# Patient Record
Sex: FEMALE | Race: WHITE | NOT HISPANIC OR LATINO | Employment: UNEMPLOYED | URBAN - NONMETROPOLITAN AREA
[De-identification: names, ages, dates, MRNs, and addresses within clinical notes are randomized per-mention and may not be internally consistent; named-entity substitution may affect disease eponyms.]

---
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
## 2023-03-15 ENCOUNTER — OFFICE VISIT (OUTPATIENT)
Dept: CARDIOLOGY | Facility: CLINIC | Age: 18
End: 2023-03-15
Payer: COMMERCIAL

## 2023-03-15 VITALS
BODY MASS INDEX: 18.44 KG/M2 | DIASTOLIC BLOOD PRESSURE: 74 MMHG | SYSTOLIC BLOOD PRESSURE: 110 MMHG | HEART RATE: 121 BPM | WEIGHT: 108 LBS | HEIGHT: 64 IN

## 2023-03-15 DIAGNOSIS — R06.09 DYSPNEA ON EXERTION: ICD-10-CM

## 2023-03-15 DIAGNOSIS — R55 PRE-SYNCOPE: ICD-10-CM

## 2023-03-15 DIAGNOSIS — R55 SYNCOPE AND COLLAPSE: Primary | ICD-10-CM

## 2023-03-15 DIAGNOSIS — R00.2 PALPITATIONS: ICD-10-CM

## 2023-03-15 PROCEDURE — 1160F RVW MEDS BY RX/DR IN RCRD: CPT | Performed by: PHYSICIAN ASSISTANT

## 2023-03-15 PROCEDURE — 99204 OFFICE O/P NEW MOD 45 MIN: CPT | Performed by: PHYSICIAN ASSISTANT

## 2023-03-15 PROCEDURE — 1159F MED LIST DOCD IN RCRD: CPT | Performed by: PHYSICIAN ASSISTANT

## 2023-03-15 NOTE — PROGRESS NOTES
Problem list     Subjective   Arabella Pierre is a 17 y.o. female     Chief Complaint   Patient presents with   • Establish Care     Dizziness with standing   • Loss of Consciousness       HPI  The patient presents in the clinic today to establish cardiovascular care.  This very pleasant patient presents to the clinic today in the setting of dizziness, tachycardia, and presyncope.  She did have a syncopal episode approximately a year ago, occurring during pregnancy and felt questionably a complication related to the same by her report.  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  Irregardless, she has been seen for intermittent episodes of dizziness and presyncope since, at one point felt to be possible hypoglycemia.  She has adjusted diet to the best of her ability to address that.  She has adjusted lifestyle otherwise.  Despite that, symptoms persist.  She now would like to be evaluated for POTS.  Upon research, she feels a lot of her symptoms are related to the same.  With position change, she gets very dizzy, orthostatic by description.  She then develops a very sudden, rapid heartbeat.  She has weakness, dizziness, and even presyncope with this frequently.  Symptoms appear to be intensifying.  She has no associated chest pain.  She denies recent syncope.  She apparently has had laboratories with her primary care provider, most of which have been normal.  After ongoing symptoms, it was felt best to have the patient evaluated from cardiovascular standpoint.  She presents today in the setting.    No current outpatient medications on file prior to visit.     No current facility-administered medications on file prior to visit.       Patient has no known allergies.    Past Medical History:   Diagnosis Date   • ADHD (attention deficit hyperactivity disorder)    • Anxiety    • Bipolar 1 disorder (HCC)    • Depression    • Low BP        Social History     Socioeconomic History   •  "Marital status: Single   Tobacco Use   • Smoking status: Every Day   Substance and Sexual Activity   • Alcohol use: Never   • Drug use: Not Currently     Comment: Past Drug User.   • Sexual activity: Defer       Family History   Family history unknown: Yes       Review of Systems   Constitutional: Negative for activity change, appetite change, chills, fatigue and fever.   HENT: Negative.  Negative for congestion.    Eyes: Negative for visual disturbance.   Respiratory: Positive for shortness of breath. Negative for apnea, cough, chest tightness and wheezing.    Cardiovascular: Positive for chest pain. Negative for palpitations and leg swelling.   Gastrointestinal: Negative.  Negative for blood in stool.   Endocrine: Positive for cold intolerance and heat intolerance.   Genitourinary: Negative.  Negative for hematuria.   Musculoskeletal: Positive for back pain and gait problem. Negative for arthralgias, joint swelling, neck pain and neck stiffness.   Skin: Negative.  Negative for color change, rash and wound.   Allergic/Immunologic: Negative.  Negative for environmental allergies and food allergies.   Neurological: Positive for dizziness, syncope, weakness and light-headedness. Negative for numbness and headaches.   Hematological: Bruises/bleeds easily.   Psychiatric/Behavioral: Negative.  Negative for sleep disturbance.       Objective   Vitals:    03/15/23 1021 03/15/23 1022 03/15/23 1023   BP: 113/73 110/73 110/74   BP Location: Left arm Left arm Right arm   Patient Position: Lying Sitting Standing   Cuff Size: Adult Adult Adult   Pulse: 85 96 (!) 121   Weight: 49 kg (108 lb)     Height: 162.6 cm (64\")        /74 (BP Location: Right arm, Patient Position: Standing, Cuff Size: Adult)   Pulse (!) 121   Ht 162.6 cm (64\")   Wt 49 kg (108 lb)   BMI 18.54 kg/m²    Lab Results (most recent)     None        Physical Exam  Vitals and nursing note reviewed.   Constitutional:       General: She is not in acute " distress.     Appearance: She is well-developed.   HENT:      Head: Normocephalic and atraumatic.   Eyes:      Conjunctiva/sclera: Conjunctivae normal.      Pupils: Pupils are equal, round, and reactive to light.   Neck:      Vascular: No JVD.      Trachea: No tracheal deviation.   Cardiovascular:      Rate and Rhythm: Normal rate and regular rhythm.      Heart sounds: Normal heart sounds.   Pulmonary:      Effort: Pulmonary effort is normal.      Breath sounds: Normal breath sounds.   Abdominal:      General: Bowel sounds are normal. There is no distension.      Palpations: Abdomen is soft. There is no mass.      Tenderness: There is no abdominal tenderness. There is no guarding or rebound.   Musculoskeletal:         General: No tenderness or deformity. Normal range of motion.      Cervical back: Normal range of motion and neck supple.   Skin:     General: Skin is warm and dry.      Coloration: Skin is not pale.      Findings: No erythema or rash.   Neurological:      Mental Status: She is alert and oriented to person, place, and time.   Psychiatric:         Behavior: Behavior normal.         Thought Content: Thought content normal.         Judgment: Judgment normal.           Procedure   Procedures       Assessment & Plan      Diagnosis Plan   1. Syncope and collapse  Tilt Table    Adult Transthoracic Echo Complete W/ Cont if Necessary Per Protocol    Cardiac Event Monitor      2. Pre-syncope        3. Palpitations        4. Dyspnea on exertion          1.  The patient presents today for recurrent episodes of palpitations/tachycardia, dizziness, presyncope, and even history of syncope.  She is very concerned that symptoms reflect POTS.  With orthostatic blood pressure checks today, she had significant tachycardia, but really no orthostatic blood pressure checks.  Irregardless, I feel the patient needs further evaluation.  Given her extensive history as above, we will schedule for tilt table for further  evaluation.    2.  I would also schedule for an event monitor.  I feel we need to evaluate for dysrhythmic substrates, or rate or rhythm disturbance issues otherwise.    3.  We will schedule for an echocardiogram to evaluate LV size and function, valvular morphologies, and cardiac structure otherwise.    4.  We will make no adjustments to medications.  We may have to adjust pending results of all the above.  I suspect we will at least have to use and consider beta-blocker therapy, may be even medications to foster blood pressure.  We will make no adjustments until we can see study results.    5.  We have encouraged increase hydration, liberal salt utilization, and lifestyle changes otherwise to address current symptoms.    6.  Further pending all the above.           Arabella Irwinhart  reports that she has been smoking. She does not have any smokeless tobacco history on file.. I have educated her on the risk of diseases from using tobacco products such as cancer, COPD and heart disease.     I advised her to quit and she is not willing to quit.    I spent 3  minutes counseling the patient.       Patient did not bring med list or medicine bottles to appointment, med list has been reviewed and updated based on patient's knowledge of their meds.         Electronically signed by:

## 2023-03-16 ENCOUNTER — TELEPHONE (OUTPATIENT)
Dept: CARDIOLOGY | Facility: CLINIC | Age: 18
End: 2023-03-16

## 2023-03-16 NOTE — TELEPHONE ENCOUNTER
Caller: Arabella Pierre    Relationship to patient: Self    Best call back number: [phone]    Patient is needing: PT CALLED TO REPORT THAT THE MONITOR WILL NOT STICK ON, SHE HAS USED SEVERAL OF THE PROVIDED BANDAGES AND IT WONT STAY - PT WOULD LIKE TO COME BY THE OFFICE TO GET EXTRA STICKY ONES IF THEY ARE AVAILABLE

## 2023-03-20 ENCOUNTER — TELEPHONE (OUTPATIENT)
Dept: CARDIOLOGY | Facility: CLINIC | Age: 18
End: 2023-03-20
Payer: COMMERCIAL

## 2023-03-20 NOTE — TELEPHONE ENCOUNTER
Cardiac Report:Day 3 Serious    Report Analysis: SVT    Per Lokesh Hylton,PAC please get sx check.    Patient states no sx, and monitor is not working will come in, in the am and I will help her with it.\\    Lokesh Hylton,PAC aware      Rose Mendoza MA

## 2023-03-27 ENCOUNTER — TELEPHONE (OUTPATIENT)
Dept: CARDIOLOGY | Facility: CLINIC | Age: 18
End: 2023-03-27
Payer: COMMERCIAL

## 2023-03-27 NOTE — TELEPHONE ENCOUNTER
Received notification of no transmission from [employer]. I had to leave  for a return call. Needing to see if there is something we can help with.

## 2023-03-28 ENCOUNTER — TELEPHONE (OUTPATIENT)
Dept: CARDIOLOGY | Facility: CLINIC | Age: 18
End: 2023-03-28
Payer: COMMERCIAL

## 2023-04-07 ENCOUNTER — HOSPITAL ENCOUNTER (OUTPATIENT)
Dept: CARDIOLOGY | Facility: HOSPITAL | Age: 18
Discharge: HOME | End: 2023-04-07
Payer: COMMERCIAL

## 2023-04-07 DIAGNOSIS — R55 SYNCOPE AND COLLAPSE: ICD-10-CM

## 2023-04-07 LAB
BH CV ECHO MEAS - ACS: 2.07 CM
BH CV ECHO MEAS - AO MAX PG: 3.9 MMHG
BH CV ECHO MEAS - AO MEAN PG: 2.6 MMHG
BH CV ECHO MEAS - AO ROOT DIAM: 2.6 CM
BH CV ECHO MEAS - AO V2 MAX: 99.1 CM/SEC
BH CV ECHO MEAS - AO V2 VTI: 22.3 CM
BH CV ECHO MEAS - EDV(CUBED): 73.6 ML
BH CV ECHO MEAS - EDV(MOD-SP4): 47 ML
BH CV ECHO MEAS - EF(MOD-SP4): 66 %
BH CV ECHO MEAS - ESV(CUBED): 21 ML
BH CV ECHO MEAS - ESV(MOD-SP4): 16 ML
BH CV ECHO MEAS - FS: 34.1 %
BH CV ECHO MEAS - IVS/LVPW: 0.72 CM
BH CV ECHO MEAS - IVSD: 0.65 CM
BH CV ECHO MEAS - LA DIMENSION: 2.1 CM
BH CV ECHO MEAS - LAT PEAK E' VEL: 13.7 CM/SEC
BH CV ECHO MEAS - LV DIASTOLIC VOL/BSA (35-75): 31.2 CM2
BH CV ECHO MEAS - LV MASS(C)D: 96.3 GRAMS
BH CV ECHO MEAS - LV SYSTOLIC VOL/BSA (12-30): 10.6 CM2
BH CV ECHO MEAS - LVIDD: 4.2 CM
BH CV ECHO MEAS - LVIDS: 2.8 CM
BH CV ECHO MEAS - LVPWD: 0.9 CM
BH CV ECHO MEAS - MED PEAK E' VEL: 13.5 CM/SEC
BH CV ECHO MEAS - MV A MAX VEL: 75 CM/SEC
BH CV ECHO MEAS - MV DEC SLOPE: 882 CM/SEC2
BH CV ECHO MEAS - MV E MAX VEL: 90 CM/SEC
BH CV ECHO MEAS - MV E/A: 1.2
BH CV ECHO MEAS - RVDD: 1.53 CM
BH CV ECHO MEAS - SI(MOD-SP4): 20.6 ML/M2
BH CV ECHO MEAS - SV(MOD-SP4): 31 ML
BH CV ECHO MEASUREMENTS AVERAGE E/E' RATIO: 6.62
MAXIMAL PREDICTED HEART RATE: 200 BPM
STRESS TARGET HR: 170 BPM

## 2023-04-07 PROCEDURE — 93306 TTE W/DOPPLER COMPLETE: CPT

## 2023-05-01 ENCOUNTER — TELEPHONE (OUTPATIENT)
Dept: CARDIOLOGY | Facility: CLINIC | Age: 18
End: 2023-05-01
Payer: COMMERCIAL

## 2023-05-01 NOTE — TELEPHONE ENCOUNTER
Caller: Arabella Pierre    Relationship: Self    Best call back number: [phone]    What is the best time to reach you: ANY    Who are you requesting to speak with (clinical staff, provider,  specific staff member): CLINICAL     What was the call regarding: PT WAS RETURNING CALL TO OFFICE ABOUT HER ECHO RESULTS. ATTEMPTED TO WARM TRANSFER.     Do you require a callback: YES

## 2023-05-01 NOTE — TELEPHONE ENCOUNTER
Patient has been notified, she states she is stable at this time. Was currently diagnosed with POTS, and does have sx sometimes but wants to keep follow up at this time. If any new or worsening she will contact office.       ----- Message from RENATO Dale sent at 4/30/2023  6:10 PM EDT -----  Routine follow-up.  Fairly frequent episodes of sinus tachycardia noted.  If symptomatic, suppression with beta-blocker therapy would be indicated.  ----- Message -----  From: Rodrigo Arizmendi MD  Sent: 4/30/2023  11:51 AM EDT  To: RENATO Dale      Result Text  1.  Sinus rhythm is a baseline and predominant rhythm noted throughout the study.  2.  Recurrent episodes of sinus tachycardia are noted, maximum heart rate at 180 bpm.  3.  An episode of narrow complex tachycardia is reported at 4:30 PM on 3/17/2023.  Rate was 161 bpm.  This appears to be sinus rhythm, but in some parts of telemetry SVT cannot be excluded.  Clinical correlation is recommended.  4.  Symptoms recurrently occur during sinus tachycardia.  5.  No other arrhythmia, ectopy, or block of significance is noted.

## 2023-05-01 NOTE — TELEPHONE ENCOUNTER
ECHO  Called patient to notify of no acute findings or abnormalities. Keep follow up as scheduled. If you have any problem between now and then give our office a call.   ----- Message from Rose Mendoza MA sent at 5/1/2023  8:51 AM EDT -----    ----- Message -----  From: Lokesh Hylton PA  Sent: 4/30/2023   6:09 PM EDT  To: Rose Mendoza MA    Routine follow-up.  ----- Message -----  From: Rodrigo Arizmendi MD  Sent: 4/29/2023   2:52 PM EDT  To: RENATO Dale

## 2023-07-24 ENCOUNTER — TELEPHONE (OUTPATIENT)
Dept: CARDIOLOGY | Facility: CLINIC | Age: 18
End: 2023-07-24

## 2023-07-24 NOTE — TELEPHONE ENCOUNTER
Caller: Arabella Pierre    Relationship to patient: Self    Best call back number:[phone] ANYTIME OK TO LEAVE VM    Chief complaint: HOSPITAL FOLLOW UP        Requested date:   HOSPITAL FOLLOW UP 2-3 DAYS FROM 7-24-23    If rescheduling, when is the original appointment:  10-18-23    Additional notes: PATIENT WAS IN HOSPITAL AND NEEDS A FOLLOW UP APPOINTMENT . PLEASE REACH OUT TO PATIENT TO RESCHEDULE 10-18-23 SOONER

## 2023-08-02 ENCOUNTER — OFFICE VISIT (OUTPATIENT)
Dept: CARDIOLOGY | Facility: CLINIC | Age: 18
End: 2023-08-02
Payer: COMMERCIAL

## 2023-08-02 VITALS
OXYGEN SATURATION: 98 % | HEIGHT: 64 IN | BODY MASS INDEX: 18.51 KG/M2 | HEART RATE: 81 BPM | SYSTOLIC BLOOD PRESSURE: 110 MMHG | DIASTOLIC BLOOD PRESSURE: 74 MMHG | WEIGHT: 108.4 LBS

## 2023-08-02 DIAGNOSIS — R55 SYNCOPE AND COLLAPSE: Primary | ICD-10-CM

## 2023-08-02 DIAGNOSIS — R00.2 PALPITATIONS: ICD-10-CM

## 2023-08-02 DIAGNOSIS — R55 PRE-SYNCOPE: ICD-10-CM

## 2023-08-02 PROCEDURE — 1159F MED LIST DOCD IN RCRD: CPT | Performed by: PHYSICIAN ASSISTANT

## 2023-08-02 PROCEDURE — 1160F RVW MEDS BY RX/DR IN RCRD: CPT | Performed by: PHYSICIAN ASSISTANT

## 2023-08-02 PROCEDURE — 99214 OFFICE O/P EST MOD 30 MIN: CPT | Performed by: PHYSICIAN ASSISTANT

## 2023-08-22 ENCOUNTER — OFFICE VISIT (OUTPATIENT)
Dept: CARDIOLOGY | Facility: CLINIC | Age: 18
End: 2023-08-22
Payer: COMMERCIAL

## 2023-08-22 VITALS
BODY MASS INDEX: 17.75 KG/M2 | HEIGHT: 64 IN | OXYGEN SATURATION: 99 % | DIASTOLIC BLOOD PRESSURE: 72 MMHG | SYSTOLIC BLOOD PRESSURE: 102 MMHG | HEART RATE: 74 BPM | WEIGHT: 104 LBS

## 2023-08-22 DIAGNOSIS — G90.A POTS (POSTURAL ORTHOSTATIC TACHYCARDIA SYNDROME): Primary | ICD-10-CM

## 2023-08-22 DIAGNOSIS — E16.2 HYPOGLYCEMIA: ICD-10-CM

## 2023-08-22 DIAGNOSIS — I95.89 CHRONIC HYPOTENSION: ICD-10-CM

## 2023-08-22 PROBLEM — Z00.8 MEDICAL CLEARANCE FOR INCARCERATION: Status: ACTIVE | Noted: 2023-08-22

## 2023-08-22 PROBLEM — Z72.89 DELIBERATE SELF-CUTTING: Status: ACTIVE | Noted: 2023-08-22

## 2023-08-22 PROBLEM — R07.89 ATYPICAL CHEST PAIN: Status: ACTIVE | Noted: 2023-08-22

## 2023-08-22 PROBLEM — R45.851 SUICIDAL IDEATIONS: Status: ACTIVE | Noted: 2023-08-22

## 2023-08-22 PROBLEM — F12.90 MARIJUANA USE: Status: ACTIVE | Noted: 2023-08-22

## 2023-08-22 PROBLEM — F15.10 METHAMPHETAMINE USE: Status: ACTIVE | Noted: 2023-08-22

## 2023-08-22 PROBLEM — T50.901A MEDICATION OVERDOSE: Status: ACTIVE | Noted: 2023-08-22

## 2023-08-22 PROBLEM — N92.0 MENORRHAGIA: Status: ACTIVE | Noted: 2023-08-22

## 2023-08-22 PROBLEM — R10.9 ABDOMINAL PAIN: Status: ACTIVE | Noted: 2023-08-22

## 2023-08-22 PROBLEM — E87.6 HYPOKALEMIA: Status: ACTIVE | Noted: 2023-08-22

## 2023-08-22 PROBLEM — F32.A DEPRESSION: Status: ACTIVE | Noted: 2023-08-22

## 2023-08-22 PROBLEM — R51.9 HEADACHE: Status: ACTIVE | Noted: 2023-08-22

## 2023-08-22 PROCEDURE — 99214 OFFICE O/P EST MOD 30 MIN: CPT | Performed by: NURSE PRACTITIONER

## 2023-08-22 RX ORDER — MIDODRINE HYDROCHLORIDE 2.5 MG/1
2.5 TABLET ORAL 2 TIMES DAILY
Qty: 180 TABLET | Refills: 1 | Status: SHIPPED | OUTPATIENT
Start: 2023-08-22

## 2023-08-22 RX ORDER — BLOOD-GLUCOSE METER
1 KIT MISCELLANEOUS DAILY
Qty: 1 EACH | Refills: 1 | Status: SHIPPED | OUTPATIENT
Start: 2023-08-22

## 2023-08-22 RX ORDER — WEIGH SCALE
1 MISCELLANEOUS MISCELLANEOUS DAILY
Qty: 1 EACH | Refills: 0 | Status: SHIPPED | OUTPATIENT
Start: 2023-08-22

## 2023-08-22 RX ORDER — ONDANSETRON 4 MG/1
4 TABLET, ORALLY DISINTEGRATING ORAL
COMMUNITY
Start: 2023-07-21

## 2023-08-22 RX ORDER — METOPROLOL SUCCINATE 25 MG/1
25 TABLET, EXTENDED RELEASE ORAL DAILY
Qty: 90 TABLET | Refills: 1 | Status: SHIPPED | OUTPATIENT
Start: 2023-08-22

## 2023-08-22 NOTE — PROGRESS NOTES
[facility] Provider Note     Date:   2023   Name: Arabella Pierre  :   2005  PCP: Josette Huggins APRN    Chief Complaint   Patient presents with    Establish Care     POTS       Subjective     History of Present Illness  Arabella Pierre is a 18 y.o. female who presents today to establish cardiac care for POTS in our office.  She was seen by RENATO Anderson cardiology [place].  Her tilt table test was positive.  She reports for the last 3 years since she got pregnant she has had body aches, chest pain, her back hurts, dizziness, nausea, lightheaded, stomach pain, passes out loses consciousness sometimes, blurry vision.  She had symptoms without passing out as recent as this morning.  Her daughter is 2 years old.  She got pregnant when she was 15 years old she was actively doing math, snorting and smoking it as well as THC and pain pills.  She stopped as soon as she found out but did continue to smoke marijuana until she was little over 8 months pregnant.    She already make sure to drink 2 to 3 L of water a day and has increased her sodium to 8 g a day.  I suggested she also try compression stockings.  She has known anxiety and QUINN that she was prescribed BuSpar and Celexa for but never started it.  She reports with her past history of drug abuse she is afraid to take medications.  Today she would like to do a trial of midodrine and metoprolol.  We will start both at a low dose.  She is to call reach out if she has any issues as she reports she cannot come back for follow-up until her boyfriend who is in the  is back in town in November.  She drives but only locally in [facility] she is afraid to drive long distances.    She reports that she uses cigarettes and vapes all day.  She only very rarely drinks alcohol.  Her [place] score is a 0 today.  Holter sinus tachycardia.    Lastly, she says the Er told her her blood sugar gets low and she  needs to eat more regularly.  I am going to ask her to repeat fasting labs as well as order her blood pressure cuff and glucometer to see if insurance will pay for it.  I would like for her to be able to check her blood pressure and heart rate as she starting the midodrine and metoprolol as well as keep an eye on her blood sugar.    Coexisting ADHD, anxiety, depression, bipolar 1 hypotension, history of meth use, history of overdose, history of incarceration, history of deliberate self cutting.    Avoid Caffeine and stimulants  Drink 2-3 liters of water a day  Increase sodium to 8 grams a day  Exercise regularly  Start Counseling to help with anxiety  Rise slowly  Eat small, regular meals and avoid simple carbs   Start metoprolol and Midodrine  Check blood pressure, heart rate, and glucose regularly  Anxiety/stress reduction  Compression Stockings    Cardiology and sleep related problem list    POTS with positive tilt table  [place] 0      Coexisting ADHD, anxiety, depression, bipolar 1 hypotension, history of meth use, history of overdose, history of incarceration, history of deliberate self cutting.    Allergies   Allergen Reactions    Penicillins Other (See Comments)     Unknown reaction. Mother has severe action and cause death in mother and will not take medication       Current Outpatient Medications:     ondansetron ODT (ZOFRAN-ODT) 4 MG disintegrating tablet, 1 tablet., Disp: , Rfl:     Blood Pressure Monitoring (Blood Pressure Monitor/S Cuff) misc, 1 Units Daily., Disp: 1 each, Rfl: 0    glucose monitor monitoring kit, 1 each Daily., Disp: 1 each, Rfl: 1    metoprolol succinate XL (TOPROL-XL) 25 MG 24 hr tablet, Take 1 tablet by mouth Daily., Disp: 90 tablet, Rfl: 1    midodrine (PROAMATINE) 2.5 MG tablet, Take 1 tablet by mouth 2 (Two) Times a Day., Disp: 180 tablet, Rfl: 1    Past Medical History:   Diagnosis Date    ADHD (attention deficit hyperactivity disorder)     Anxiety     Bipolar 1 disorder      "Depression     Low BP       History reviewed. No pertinent surgical history.  Family History   Family history unknown: Yes     Social History     Socioeconomic History    Marital status: Single   Tobacco Use    Smoking status: Every Day     Passive exposure: Current    Smokeless tobacco: Never   Vaping Use    Vaping Use: Every day    Substances: Nicotine, Flavoring    Devices: Disposable    Passive vaping exposure: Yes   Substance and Sexual Activity    Alcohol use: Yes     Comment: very rare    Drug use: Not Currently     Types: Marijuana, Amphetamines     Comment: Past Drug User.    Sexual activity: Yes     Partners: Male       Objective     Vital Signs:  /72 (BP Location: Left arm)   Pulse 74   Ht 162.6 cm (64\")   Wt 47.2 kg (104 lb)   SpO2 99%   BMI 17.85 kg/mý   Estimated body mass index is 17.85 kg/mý as calculated from the following:    Height as of this encounter: 162.6 cm (64\").    Weight as of this encounter: 47.2 kg (104 lb).  Facility age limit for growth percentiles is 20 years.          Physical Exam                Assessment and Plan     Diagnoses and all orders for this visit:    1. POTS (postural orthostatic tachycardia syndrome) (Primary)  Comments:  Lifestyle management discussed.  Rx midodrine and metoprolol.  Orders:  -     Basic Metabolic Panel; Future  -     CBC & Differential; Future  -     TSH Rfx On Abnormal To Free T4; Future  -     Lipid Panel; Future  -     Iron; Future  -     Cancel: Cuffed BP Gauge ANEROID With Gauge  -     glucose monitor monitoring kit; 1 each Daily.  Dispense: 1 each; Refill: 1  -     Blood Pressure Monitoring (Blood Pressure Monitor/S Cuff) misc; 1 Units Daily.  Dispense: 1 each; Refill: 0    2. Chronic hypotension  Comments:  Since we are starting beta-blocker for POTS we will do midodrine  Orders:  -     Cancel: Cuffed BP Gauge ANEROID With Gauge  -     glucose monitor monitoring kit; 1 each Daily.  Dispense: 1 each; Refill: 1  -     Blood Pressure " Monitoring (Blood Pressure Monitor/S Cuff) misc; 1 Units Daily.  Dispense: 1 each; Refill: 0    3. Hypoglycemia  Comments:  Eat frequent meals.  Rx glucometer.  Update labs.    Other orders  -     midodrine (PROAMATINE) 2.5 MG tablet; Take 1 tablet by mouth 2 (Two) Times a Day.  Dispense: 180 tablet; Refill: 1  -     metoprolol succinate XL (TOPROL-XL) 25 MG 24 hr tablet; Take 1 tablet by mouth Daily.  Dispense: 90 tablet; Refill: 1        Recommendations: ER if symptoms increase, Report if any new/changing symptoms immediately, Compression hose, Stop cigarettes, Limit caffeine, Exercise recommendations discussed, and Sleep hygiene discussed          Follow Up  Return in about 2 months (around 10/22/2023) for or when she has transportation.  Patient was given instructions and counseling regarding her condition or for health maintenance advice. Please see specific information pulled into the AVS if appropriate.

## 2023-08-22 NOTE — PATIENT INSTRUCTIONS
Avoid Caffeine and stimulants  Drink 2-3 liters of water a day  Increase sodium to 8 grams a day  Exercise regularly  Start Counseling to help with anxiety  Rise slowly  Eat small, regular meals and avoid simple carbs   Start metoprolol and Midodrine  Check blood pressure, heart rate, and glucose regularly  Anxiety/stress reduction  Compression Stockings

## 2023-08-23 DIAGNOSIS — G90.A POTS (POSTURAL ORTHOSTATIC TACHYCARDIA SYNDROME): ICD-10-CM

## 2023-09-22 ENCOUNTER — TELEPHONE (OUTPATIENT)
Dept: CARDIOLOGY | Facility: CLINIC | Age: 18
End: 2023-09-22

## 2023-09-22 NOTE — TELEPHONE ENCOUNTER
Caller: Arabella Pierre    Relationship: Self    Best call back number: [phone]    What is the best time to reach you: ANY     What was the call regarding: PT STATES SHE MISSED A CALL AT 10:03 AM AND WAS CALLING BACK. PLEASE REACH OUT TO PT TO  FURTHER ADVISE.     Is it okay if the provider responds through MyChart: NO

## 2023-09-25 NOTE — TELEPHONE ENCOUNTER
Tried to contact pt to see how she is doing from a cardiac standpoint. If she is not having any issues and wants to bump out to Lokesh next available in June. If not it is fine to keept the appt on 10/18/2023.

## 2023-09-25 NOTE — TELEPHONE ENCOUNTER
Caller: Arabella Pierre    Relationship: Self    Best call back number: [phone]    What is the best time to reach you: IN AFTERNOON    What was the call regarding: PT REPORTS  WANTS TO KEEP HER APPT ON 10/18. SHE WAS PUT ON SOME NEW MEDICATIONS AND THESE HAVE BEEN GIVING HER A HEADACHE THE MORNING AFTER SHE TAKES, SHE HAS NOT BEEN TAKING THESE NOW.

## 2023-10-18 ENCOUNTER — OFFICE VISIT (OUTPATIENT)
Dept: CARDIOLOGY | Facility: CLINIC | Age: 18
End: 2023-10-18
Payer: COMMERCIAL

## 2023-10-18 VITALS
DIASTOLIC BLOOD PRESSURE: 78 MMHG | HEART RATE: 89 BPM | HEIGHT: 64 IN | OXYGEN SATURATION: 100 % | BODY MASS INDEX: 18.2 KG/M2 | SYSTOLIC BLOOD PRESSURE: 112 MMHG | WEIGHT: 106.6 LBS

## 2023-10-18 DIAGNOSIS — R55 PRE-SYNCOPE: ICD-10-CM

## 2023-10-18 DIAGNOSIS — R00.2 PALPITATIONS: ICD-10-CM

## 2023-10-18 DIAGNOSIS — G90.A POTS (POSTURAL ORTHOSTATIC TACHYCARDIA SYNDROME): Primary | ICD-10-CM

## 2023-10-18 DIAGNOSIS — R06.09 DYSPNEA ON EXERTION: ICD-10-CM

## 2023-10-18 PROCEDURE — 1160F RVW MEDS BY RX/DR IN RCRD: CPT | Performed by: PHYSICIAN ASSISTANT

## 2023-10-18 PROCEDURE — 1159F MED LIST DOCD IN RCRD: CPT | Performed by: PHYSICIAN ASSISTANT

## 2023-10-18 PROCEDURE — 99214 OFFICE O/P EST MOD 30 MIN: CPT | Performed by: PHYSICIAN ASSISTANT

## 2023-10-18 PROCEDURE — 93000 ELECTROCARDIOGRAM COMPLETE: CPT | Performed by: PHYSICIAN ASSISTANT

## 2023-10-18 NOTE — PROGRESS NOTES
Problem list     Subjective   Arabella Pierre is a 18 y.o. female     Chief Complaint   Patient presents with    Follow-up     7 month    Chest Pain       HPI  This pleasant patient presents back to the clinic today to discuss ongoing symptoms.  She was seen last evaluation to review study results.  She initially was evaluated because of her voiced concern of POTS, given symptoms.  She was scheduled for testing.  She had an echocardiogram which indicated preserved systolic function with no significant valvular nor structural abnormalities noted.  Event monitor indicated sinus rhythm with recurrent episodes of sinus tachycardia.  In 1 telemetry strip, SVT cannot be excluded.  It was felt that this was likely just sinus tachycardia.  Tilt table was performed and did indeed suggest POTS.  She was sent to a POTS clinic in [place].  She was seen and evaluated and placed on metoprolol and midodrine.  She stopped those because of reported headache on combination of those medicines.  She presents back today however because of persistent symptoms.  She has recurrent episodes of orthostasis, tachycardia, and presyncope.  She would like to discuss reinstitution of medications and requested an appointment to do that.  She denies chest pain.  Dyspnea is at baseline.  She has no further complaints.    Current Outpatient Medications on File Prior to Visit   Medication Sig Dispense Refill    glucose monitor monitoring kit 1 each Daily. 1 each 1    [DISCONTINUED] Blood Pressure Monitoring (Blood Pressure Monitor/S Cuff) misc 1 Units Daily. 1 each 0    [DISCONTINUED] metoprolol succinate XL (TOPROL-XL) 25 MG 24 hr tablet Take 1 tablet by mouth Daily. 90 tablet 1    [DISCONTINUED] midodrine (PROAMATINE) 2.5 MG tablet Take 1 tablet by mouth 2 (Two) Times a Day. 180 tablet 1    [DISCONTINUED] ondansetron ODT (ZOFRAN-ODT) 4 MG disintegrating tablet 1 tablet.       No current facility-administered medications on file prior to visit.        Penicillins    Past Medical History:   Diagnosis Date    ADHD (attention deficit hyperactivity disorder)     Anxiety     Bipolar 1 disorder     Depression     Low BP        Social History     Socioeconomic History    Marital status: Single   Tobacco Use    Smoking status: Every Day     Passive exposure: Current    Smokeless tobacco: Never   Vaping Use    Vaping Use: Every day    Substances: Nicotine, Flavoring    Devices: Disposable    Passive vaping exposure: Yes   Substance and Sexual Activity    Alcohol use: Yes     Comment: very rare    Drug use: Not Currently     Types: Marijuana, Amphetamines     Comment: Past Drug User.    Sexual activity: Yes     Partners: Male       Family History   Family history unknown: Yes       Review of Systems   Constitutional:  Positive for diaphoresis and fatigue. Negative for chills and fever.   Eyes:  Positive for visual disturbance (floaters).   Respiratory: Negative.  Negative for apnea, cough, chest tightness, shortness of breath and wheezing.    Cardiovascular:  Positive for chest pain and palpitations. Negative for leg swelling.   Gastrointestinal: Negative.  Negative for abdominal pain and blood in stool.   Endocrine: Negative.  Negative for cold intolerance and heat intolerance.   Genitourinary: Negative.  Negative for hematuria.   Musculoskeletal:  Positive for back pain. Negative for arthralgias, myalgias, neck pain and neck stiffness.   Skin: Negative.  Negative for rash and wound.   Allergic/Immunologic: Negative.  Negative for environmental allergies and food allergies.   Neurological:  Positive for dizziness, weakness, numbness and headaches. Negative for syncope and light-headedness.   Hematological: Negative.  Does not bruise/bleed easily.   Psychiatric/Behavioral: Negative.  Negative for sleep disturbance.        Objective   Vitals:    10/18/23 1438   BP: 112/78   BP Location: Left arm   Patient Position: Sitting   Cuff Size: Adult   Pulse: 89   SpO2: 100%  "  Weight: 48.4 kg (106 lb 9.6 oz)   Height: 162.6 cm (64\")      /78 (BP Location: Left arm, Patient Position: Sitting, Cuff Size: Adult)   Pulse 89   Ht 162.6 cm (64\")   Wt 48.4 kg (106 lb 9.6 oz)   SpO2 100%   BMI 18.30 kg/m²    Lab Results (most recent)       None          Physical Exam  Vitals and nursing note reviewed.   Constitutional:       General: She is not in acute distress.     Appearance: She is well-developed.   HENT:      Head: Normocephalic and atraumatic.   Eyes:      Conjunctiva/sclera: Conjunctivae normal.      Pupils: Pupils are equal, round, and reactive to light.   Neck:      Vascular: No JVD.      Trachea: No tracheal deviation.   Cardiovascular:      Rate and Rhythm: Normal rate and regular rhythm.      Heart sounds: Normal heart sounds.   Pulmonary:      Effort: Pulmonary effort is normal.      Breath sounds: Normal breath sounds.   Abdominal:      General: Bowel sounds are normal. There is no distension.      Palpations: Abdomen is soft. There is no mass.      Tenderness: There is no abdominal tenderness. There is no guarding or rebound.   Musculoskeletal:         General: No tenderness or deformity. Normal range of motion.      Cervical back: Normal range of motion and neck supple.   Skin:     General: Skin is warm and dry.      Coloration: Skin is not pale.      Findings: No erythema or rash.   Neurological:      Mental Status: She is alert and oriented to person, place, and time.   Psychiatric:         Behavior: Behavior normal.         Thought Content: Thought content normal.         Judgment: Judgment normal.           Procedure     ECG 12 Lead    Date/Time: 10/18/2023 2:44 PM  Performed by: Lokesh Hylton PA    Authorized by: Lokesh Hylton PA  Comparison: compared with previous ECG from 6/20/2023  Comparison to previous ECG: Sinus rhythm, rate 80, borderline short CA interval, RSR prime V1 V2, no evidence of ventricular preexcitation, no acute changes noted.         "     Assessment & Plan      Diagnosis Plan   1. POTS (postural orthostatic tachycardia syndrome)        2. Pre-syncope        3. Palpitations        4. Dyspnea on exertion          1.  The patient presents back for evaluation.  She was recently seen through a POTS clinic and instituted on metoprolol and midodrine.  She stopped both those medications because of headache and side effects otherwise.  She agrees to challenge the midodrine at this time.  I feel she needs to rechallenge medications because of ongoing symptoms which appear to be fairly severe.  She does not feel that the midodrine was problematic.  She will restart that at 2.5 mg twice a day as previously dosed.  If tolerated, we will then challenge an alternate beta-blocker agent.  She will call back in a couple weeks and we will institute accordingly.    2.  We will see her back for symptom check in 6 weeks to ensure ongoing stability and further modify medical regimen at that time if needed.    3.  We have reviewed all of her previous study results, as outlined above, in detail again with the patient today.  She acknowledges understanding of findings.  She will continue to follow-up with POTS clinic otherwise.           Arabella Ignacio  reports that she has been smoking. She has been exposed to tobacco smoke. She has never used smokeless tobacco.. I have educated her on the risk of diseases from using tobacco products such as cancer, COPD, and heart disease.     I advised her to quit and she is not willing to quit.    I spent 3  minutes counseling the patient.                       Electronically signed by:

## 2023-10-19 ENCOUNTER — TELEPHONE (OUTPATIENT)
Dept: CARDIOLOGY | Facility: CLINIC | Age: 18
End: 2023-10-19

## 2023-10-19 ENCOUNTER — TELEPHONE (OUTPATIENT)
Dept: CARDIOLOGY | Facility: CLINIC | Age: 18
End: 2023-10-19
Payer: COMMERCIAL

## 2023-10-19 NOTE — TELEPHONE ENCOUNTER
"Relay     \"Calling to inform you that you 6-8 weeks follow up with Lokesh Hylton has been scheduled for 12/08/2023 @ 1:30PM. If this day or time does not work for you please reach out to the office. Thank you \"          "

## 2023-10-19 NOTE — TELEPHONE ENCOUNTER
Explained Midodrine was prescribed for dizziness and near syncope. She was advised to monitor BP/HR and call office with readings. BP this morning 118/69, 79.

## 2023-10-19 NOTE — TELEPHONE ENCOUNTER
Caller: Arabella Pierre    Relationship: Self    Best call back number: [phone] - PREFER AFTERNOON PHONE CALLS    What medications are you currently taking:   Current Outpatient Medications on File Prior to Visit   Medication Sig Dispense Refill    glucose monitor monitoring kit 1 each Daily. 1 each 1     No current facility-administered medications on file prior to visit.          Which medication are you concerned about: MIDODRINE    Who prescribed you this medication: KRYSTAL    What are your concerns: THE PATIENT SAW ANGEL ON 10.18.23 AND HE TOLD HER TO TRY TAKING THE MEDICATION. THE PATIENT IS CONFUSED ON WHAT THIS MEDICATION IS FOR AS SHE ALREADY HAS RAISED BLOOD PRESSURE AND HER ONLINE SEARCH RETURNED THAT THE MEDICATION IS SUPPOSED TO INCREASE BLOOD PRESSURE.    How long have you had these concerns: 10.19.23

## 2023-12-07 ENCOUNTER — OFFICE VISIT (OUTPATIENT)
Dept: CARDIOLOGY | Facility: CLINIC | Age: 18
End: 2023-12-07
Payer: COMMERCIAL

## 2023-12-07 VITALS
WEIGHT: 111 LBS | OXYGEN SATURATION: 98 % | HEIGHT: 64 IN | SYSTOLIC BLOOD PRESSURE: 119 MMHG | BODY MASS INDEX: 18.95 KG/M2 | DIASTOLIC BLOOD PRESSURE: 77 MMHG | HEART RATE: 88 BPM

## 2023-12-07 DIAGNOSIS — R06.09 DYSPNEA ON EXERTION: ICD-10-CM

## 2023-12-07 DIAGNOSIS — G90.A POTS (POSTURAL ORTHOSTATIC TACHYCARDIA SYNDROME): Primary | ICD-10-CM

## 2023-12-07 DIAGNOSIS — R00.2 PALPITATIONS: ICD-10-CM

## 2023-12-07 PROCEDURE — 1160F RVW MEDS BY RX/DR IN RCRD: CPT | Performed by: PHYSICIAN ASSISTANT

## 2023-12-07 PROCEDURE — 99213 OFFICE O/P EST LOW 20 MIN: CPT | Performed by: PHYSICIAN ASSISTANT

## 2023-12-07 PROCEDURE — 1159F MED LIST DOCD IN RCRD: CPT | Performed by: PHYSICIAN ASSISTANT

## 2023-12-07 RX ORDER — METOPROLOL SUCCINATE 25 MG/1
25 TABLET, EXTENDED RELEASE ORAL DAILY
COMMUNITY

## 2023-12-07 NOTE — PROGRESS NOTES
Problem list     Subjective   Arabella Pierre is a 18 y.o. female     Chief Complaint   Patient presents with    Follow-up     6 to 8 week follow up. Patient started on metoprolol per POTS clinic reports forget's to take it, has not taken it in past 2 weeks until this am.     Palpitations       HPI  The patient presents to the clinic today for follow-up.  She was seen at last evaluation after consultation with a POTS clinic in [place].  We had seen her because of her initial concern being POTS.  She was scheduled for testing.  Echo suggested normal systolic function with no significant valvular or structural abnormalities.  Event monitor was performed indicating sinus rhythm with sinus tachycardia.  1 episode appeared to be a short episode of SVT.  Tilt table again supported POTS.  She was sent to the clinic as above and started on midodrine and metoprolol.  She could not tolerate those medications together and stopped both.  We discussed going back on midodrine, but the patient apparently misunderstood and went back on metoprolol.  She senses felt very well.  She tells me her only issues she cannot remember to take the metoprolol daily.  She feels very well when therapy.  She denies chest pain.  She has stable dyspnea.  She has no further complaints.  She still has some degree of tachycardia and even orthostatic issues at times.    Current Outpatient Medications on File Prior to Visit   Medication Sig Dispense Refill    glucose monitor monitoring kit 1 each Daily. 1 each 1    metoprolol succinate XL (TOPROL-XL) 25 MG 24 hr tablet Take 1 tablet by mouth Daily.       No current facility-administered medications on file prior to visit.       Penicillins    Past Medical History:   Diagnosis Date    ADHD (attention deficit hyperactivity disorder)     Anxiety     Bipolar 1 disorder     Depression     Low BP        Social History     Socioeconomic History    Marital status: Single   Tobacco Use    Smoking status: Every  "Day     Passive exposure: Current    Smokeless tobacco: Never   Vaping Use    Vaping Use: Every day    Substances: Nicotine, Flavoring    Devices: Disposable    Passive vaping exposure: Yes   Substance and Sexual Activity    Alcohol use: Yes     Comment: very rare    Drug use: Not Currently     Types: Marijuana, Amphetamines     Comment: Past Drug User.    Sexual activity: Yes     Partners: Male       Family History   Family history unknown: Yes       Review of Systems   Constitutional: Negative.  Positive for diaphoresis. Negative for chills, fatigue and fever.   HENT: Negative.     Eyes: Negative.  Positive for visual disturbance (blurred vision).   Respiratory: Negative.  Negative for apnea, cough, chest tightness, shortness of breath and wheezing.    Cardiovascular:  Positive for palpitations. Negative for chest pain and leg swelling.   Gastrointestinal: Negative.  Negative for abdominal pain and blood in stool.   Endocrine: Negative.    Genitourinary: Negative.  Negative for hematuria.   Musculoskeletal: Negative.  Positive for back pain. Negative for arthralgias, myalgias, neck pain and neck stiffness.   Skin: Negative.  Negative for rash and wound.   Allergic/Immunologic: Negative.  Negative for environmental allergies and food allergies.   Neurological:  Positive for dizziness and weakness. Negative for syncope, light-headedness, numbness and headaches.   Hematological:  Does not bruise/bleed easily.   Psychiatric/Behavioral: Negative.  Negative for sleep disturbance.        Objective   Vitals:    12/07/23 1340   BP: 119/77   Pulse: 88   SpO2: 98%   Weight: 50.3 kg (111 lb)   Height: 162.6 cm (64\")      /77   Pulse 88   Ht 162.6 cm (64\")   Wt 50.3 kg (111 lb)   SpO2 98%   BMI 19.05 kg/m²    Lab Results (most recent)       None          Physical Exam  Vitals and nursing note reviewed.   Constitutional:       General: She is not in acute distress.     Appearance: She is well-developed.   HENT:      " Head: Normocephalic and atraumatic.   Eyes:      Conjunctiva/sclera: Conjunctivae normal.      Pupils: Pupils are equal, round, and reactive to light.   Neck:      Vascular: No JVD.      Trachea: No tracheal deviation.   Cardiovascular:      Rate and Rhythm: Normal rate and regular rhythm.      Heart sounds: Normal heart sounds.   Pulmonary:      Effort: Pulmonary effort is normal.      Breath sounds: Normal breath sounds.   Abdominal:      General: Bowel sounds are normal. There is no distension.      Palpations: Abdomen is soft. There is no mass.      Tenderness: There is no abdominal tenderness. There is no guarding or rebound.   Musculoskeletal:         General: No tenderness or deformity. Normal range of motion.      Cervical back: Normal range of motion and neck supple.   Skin:     General: Skin is warm and dry.      Coloration: Skin is not pale.      Findings: No erythema or rash.   Neurological:      Mental Status: She is alert and oriented to person, place, and time.   Psychiatric:         Behavior: Behavior normal.         Thought Content: Thought content normal.         Judgment: Judgment normal.           Procedure   Procedures       Assessment & Plan      Diagnosis Plan   1. POTS (postural orthostatic tachycardia syndrome)        2. Palpitations        3. Dyspnea on exertion            1.  At this time, the patient appears to be doing fairly well.  Again previous history is consistent with POTS, per tilt table findings.  Her workup otherwise has been unremarkable.  She was initially treated with midodrine and metoprolol but could not tolerate that medical regimen.  She stopped that it eventually restarted metoprolol, as above.  She feels that she has responded extraordinarily well to the same.    2.  So, with metoprolol and lifestyle changes otherwise to address POTS, the patient tells me that her symptoms really have been minimal.  At this setting, I do not feel anything further is indicated.  We will  continue to encourage hydration, sodium intake, and medications.    3.  Previous workup, as above, has been unremarkable outside of tilt table findings.  Nothing further is indicated.  This patient is improved, we will continue to see her on 6 to 8-month intervals.         Arabella Ignacio  reports that she has been smoking. She has been exposed to tobacco smoke. She has never used smokeless tobacco.. I have educated her on the risk of diseases from using tobacco products such as cancer, COPD, and heart disease.     I advised her to quit and she is not willing to quit.    I spent 3  minutes counseling the patient.                       Electronically signed by:

## 2023-12-08 ENCOUNTER — TELEPHONE (OUTPATIENT)
Dept: CARDIOLOGY | Facility: CLINIC | Age: 18
End: 2023-12-08
Payer: COMMERCIAL

## 2023-12-08 NOTE — TELEPHONE ENCOUNTER
Caller: Arabella Pierre    Relationship: Self    Best call back number: [phone]     Which medication are you concerned about: metoprolol succinate XL/ midodrine    Who prescribed you this medication: MAYO COTA, STILL REQUESTING THIS INFO BE SENT TO [facility]     When did you start taking this medication: UNKNOWN     What are your concerns: PT WAS PUT ON A MED RECENTLY, SHE IS NEEDING clarification ON WHICH MED SHE NEEDS TO BE TAKING. THINKS SHE TOOK THE WRONG MED ON 12/7/23 AND NEEDING CLINICAL TO CALL BACK AND HELP HER FIGURE THIS OUT. PLEASE ADVISE.

## 2023-12-08 NOTE — TELEPHONE ENCOUNTER
Relay...  Left detailed message for patient that there were no medication changes at visit yesterday. The only medication prescribed by Lokesh Hylton PA-C is Metoprolol Succinate 25 mg daily. Her chart does not show any other active medications. She may want to discuss any other medication concerns with Alonso COTA or the prescribing provider.

## 2023-12-13 ENCOUNTER — OFFICE VISIT (OUTPATIENT)
Dept: CARDIOLOGY | Facility: CLINIC | Age: 18
End: 2023-12-13
Payer: COMMERCIAL

## 2023-12-13 VITALS
DIASTOLIC BLOOD PRESSURE: 60 MMHG | HEART RATE: 83 BPM | SYSTOLIC BLOOD PRESSURE: 116 MMHG | HEIGHT: 64 IN | BODY MASS INDEX: 19.12 KG/M2 | OXYGEN SATURATION: 100 % | WEIGHT: 112 LBS

## 2023-12-13 DIAGNOSIS — I95.89 CHRONIC HYPOTENSION: ICD-10-CM

## 2023-12-13 DIAGNOSIS — G90.A POTS (POSTURAL ORTHOSTATIC TACHYCARDIA SYNDROME): ICD-10-CM

## 2023-12-13 NOTE — PROGRESS NOTES
[facility] Provider Note     Date:   2023   Name: Arabella Pierre  :   2005  PCP: Provider, No Known    Chief Complaint   Patient presents with    Follow-up     labs       Subjective     History of Present Illness  Arabella Pierre is a 18 y.o. female who presents today to follow-up on starting medication for POTS.  She is followed by [facility] [place].  Her tilt table test was positive.  Today she reports she and Mr. Decker have been working with the Metoprolol and Midodrine but she cannot tolerate the metoprolol and has stopped it. It gave her headaches.    We discussed that we are not a specialized POTS clinic and are no different than [facility] in [place].  We and Mr. Decker can do the same treatments so patient does not really need to or have to drive to [place] unless she just wants to.    Reiterated the below.  Patient plans to follow with [facility] as scheduled.    Avoid Caffeine and stimulants  Drink 2-3 liters of water a day  Increase sodium to 8 grams a day  Exercise regularly  Start Counseling to help with anxiety  Rise slowly  Eat small, regular meals and avoid simple carbs   Check blood pressure, heart rate, and glucose regularly  Anxiety/stress reduction  Compression Stockings    Cardiology and sleep related problem list    POTS with positive tilt table  [place] 0      Coexisting ADHD, anxiety, depression, bipolar 1 hypotension, history of meth use, history of overdose, history of incarceration, history of deliberate self cutting.    Allergies   Allergen Reactions    Penicillins Other (See Comments)     Unknown reaction. Mother has severe action and cause death in mother and will not take medication       Current Outpatient Medications:     glucose monitor monitoring kit, 1 each Daily., Disp: 1 each, Rfl: 1    Past Medical History:   Diagnosis Date    ADHD (attention deficit hyperactivity disorder)     Anxiety      "Bipolar 1 disorder     Depression     Low BP       History reviewed. No pertinent surgical history.  Family History   Family history unknown: Yes     Social History     Socioeconomic History    Marital status: Single   Tobacco Use    Smoking status: Every Day     Passive exposure: Current    Smokeless tobacco: Never   Vaping Use    Vaping Use: Every day    Substances: Nicotine, Flavoring    Devices: Disposable    Passive vaping exposure: Yes   Substance and Sexual Activity    Alcohol use: Yes     Comment: very rare    Drug use: Not Currently     Types: Marijuana, Amphetamines     Comment: Past Drug User.    Sexual activity: Yes     Partners: Male       Objective     Vital Signs:  /60 (BP Location: Left arm)   Pulse 83   Ht 162.6 cm (64\")   Wt 50.8 kg (112 lb)   SpO2 100%   BMI 19.22 kg/m²   Estimated body mass index is 19.22 kg/m² as calculated from the following:    Height as of this encounter: 162.6 cm (64\").    Weight as of this encounter: 50.8 kg (112 lb).  Facility age limit for growth %elsy is 20 years.          Physical Exam  Vitals reviewed.   Constitutional:       Appearance: Normal appearance. She is well-developed.   HENT:      Head: Normocephalic and atraumatic.   Eyes:      General: No scleral icterus.     Pupils: Pupils are equal, round, and reactive to light.   Neck:      Vascular: No carotid bruit.   Cardiovascular:      Rate and Rhythm: Normal rate and regular rhythm.      Pulses: Normal pulses.           Radial pulses are 2+ on the right side and 2+ on the left side.        Dorsalis pedis pulses are 2+ on the right side and 2+ on the left side.        Posterior tibial pulses are 2+ on the right side and 2+ on the left side.      Heart sounds: Normal heart sounds. No murmur heard.  Pulmonary:      Breath sounds: Normal breath sounds. No wheezing or rhonchi.   Musculoskeletal:      Right lower leg: No edema.      Left lower leg: No edema.   Skin:     Capillary Refill: Capillary refill " takes less than 2 seconds.      Coloration: Skin is not cyanotic.      Nails: There is no clubbing.   Neurological:      Mental Status: She is alert and oriented to person, place, and time.      Motor: No weakness.      Gait: Gait normal.   Psychiatric:         Mood and Affect: Mood normal.         Behavior: Behavior is cooperative.         Thought Content: Thought content normal.         Cognition and Memory: Memory normal.     Unable to tolerate beta-blocker                Assessment and Plan     Diagnoses and all orders for this visit:    1. POTS (postural orthostatic tachycardia syndrome)  Comments:  Unable to tolerate beta-blocker.  Gave written directions again.  Follow-up [place] [facility] as scheduled.    2. Chronic hypotension  Comments:  Patient decided she does not want midodrine.  Stopped it.  Written lifestyle changes given again.  Follow-up with [place] [facility] as scheduled.          Recommendations: ER if symptoms increase, Report if any new/changing symptoms immediately, Compression hose, Stop cigarettes, Limit caffeine, Exercise recommendations discussed, and Sleep hygiene discussed          Follow Up  Return for with cards [place] as scheduled.  Patient was given instructions and counseling regarding her condition or for health maintenance advice. Please see specific information pulled into the AVS if appropriate.

## 2024-08-21 ENCOUNTER — OFFICE VISIT (OUTPATIENT)
Dept: CARDIOLOGY | Facility: CLINIC | Age: 19
End: 2024-08-21
Payer: COMMERCIAL

## 2024-08-21 VITALS
HEART RATE: 91 BPM | DIASTOLIC BLOOD PRESSURE: 77 MMHG | BODY MASS INDEX: 22.02 KG/M2 | SYSTOLIC BLOOD PRESSURE: 113 MMHG | WEIGHT: 129 LBS | HEIGHT: 64 IN | OXYGEN SATURATION: 99 %

## 2024-08-21 DIAGNOSIS — G90.A POTS (POSTURAL ORTHOSTATIC TACHYCARDIA SYNDROME): Primary | ICD-10-CM

## 2024-08-21 DIAGNOSIS — R06.09 DYSPNEA ON EXERTION: ICD-10-CM

## 2024-08-21 DIAGNOSIS — R00.2 PALPITATIONS: ICD-10-CM

## 2024-08-21 PROCEDURE — 99213 OFFICE O/P EST LOW 20 MIN: CPT | Performed by: PHYSICIAN ASSISTANT

## 2024-08-21 PROCEDURE — 1159F MED LIST DOCD IN RCRD: CPT | Performed by: PHYSICIAN ASSISTANT

## 2024-08-21 PROCEDURE — 1160F RVW MEDS BY RX/DR IN RCRD: CPT | Performed by: PHYSICIAN ASSISTANT

## 2024-08-21 RX ORDER — HYDROXYZINE HYDROCHLORIDE 25 MG/1
25 TABLET, FILM COATED ORAL 3 TIMES DAILY PRN
COMMUNITY

## 2024-08-21 NOTE — PROGRESS NOTES
Problem list     Subjective   Arabella Pierre is a 19 y.o. female     Chief Complaint   Patient presents with    Follow-up     8 month follow up     Palpitations    Rapid Heart Rate       HPI  The patient presents in the clinic today for follow-up.  She has been diagnosed historically with POTS.  She had abnormal tilt table test findings.  Echo at the time indicated normal systolic function with no significant valvular nor structural abnormalities.  She was empirically treated with midodrine and metoprolol.  She was unable to tolerate both agents because of side effects.  She stopped that and intensified lifestyle changes.  She has since done well.  She has a degree of positional dizziness and tachycardia at times, nothing of significance.  She is very comfortable with her current level of symptoms.  She has no syncope.  She has no chest pain.  She has no further complaints and feels that she is doing well.    Current Outpatient Medications on File Prior to Visit   Medication Sig Dispense Refill    glucose monitor monitoring kit 1 each Daily. 1 each 1    hydrOXYzine (ATARAX) 25 MG tablet Take 1 tablet by mouth 3 (Three) Times a Day As Needed for Anxiety.       No current facility-administered medications on file prior to visit.       Penicillins    Past Medical History:   Diagnosis Date    ADHD (attention deficit hyperactivity disorder)     Anxiety     Bipolar 1 disorder     Depression     Low BP        Social History     Socioeconomic History    Marital status: Single   Tobacco Use    Smoking status: Every Day     Passive exposure: Current    Smokeless tobacco: Current    Tobacco comments:     vapes   Vaping Use    Vaping status: Every Day    Substances: Nicotine, Flavoring    Devices: Disposable    Passive vaping exposure: Yes   Substance and Sexual Activity    Alcohol use: Yes     Comment: very rare    Drug use: Not Currently     Types: Marijuana, Amphetamines     Comment: Past Drug User.    Sexual activity:  "Yes     Partners: Male       Family History   Family history unknown: Yes       Review of Systems   Constitutional:  Positive for diaphoresis. Negative for chills, fatigue and fever.   HENT: Negative.     Eyes: Negative.  Negative for visual disturbance.   Respiratory: Negative.  Negative for apnea, cough, chest tightness, shortness of breath and wheezing.    Cardiovascular:  Positive for palpitations. Negative for chest pain and leg swelling.   Gastrointestinal: Negative.  Negative for abdominal pain and blood in stool.   Endocrine: Negative.    Genitourinary: Negative.  Negative for hematuria.   Musculoskeletal:  Positive for arthralgias and back pain. Negative for myalgias, neck pain and neck stiffness.   Skin: Negative.  Negative for rash and wound.   Allergic/Immunologic: Negative.  Negative for environmental allergies and food allergies.   Neurological:  Positive for weakness and light-headedness. Negative for dizziness, syncope, numbness and headaches.   Hematological: Negative.  Does not bruise/bleed easily.   Psychiatric/Behavioral: Negative.  Negative for sleep disturbance.        Objective   Vitals:    08/21/24 1255   BP: 113/77   Pulse: 91   SpO2: 99%   Weight: 58.5 kg (129 lb)   Height: 162.6 cm (64\")      /77   Pulse 91   Ht 162.6 cm (64\")   Wt 58.5 kg (129 lb)   SpO2 99%   BMI 22.14 kg/m²    Lab Results (most recent)       None          Physical Exam  Vitals and nursing note reviewed.   Constitutional:       General: She is not in acute distress.     Appearance: She is well-developed.   HENT:      Head: Normocephalic and atraumatic.   Eyes:      Conjunctiva/sclera: Conjunctivae normal.      Pupils: Pupils are equal, round, and reactive to light.   Neck:      Vascular: No JVD.      Trachea: No tracheal deviation.   Cardiovascular:      Rate and Rhythm: Normal rate and regular rhythm.      Heart sounds: Normal heart sounds.   Pulmonary:      Effort: Pulmonary effort is normal.      Breath " sounds: Normal breath sounds.   Abdominal:      General: Bowel sounds are normal. There is no distension.      Palpations: Abdomen is soft. There is no mass.      Tenderness: There is no abdominal tenderness. There is no guarding or rebound.   Musculoskeletal:         General: No tenderness or deformity. Normal range of motion.      Cervical back: Normal range of motion and neck supple.   Skin:     General: Skin is warm and dry.      Coloration: Skin is not pale.      Findings: No erythema or rash.   Neurological:      Mental Status: She is alert and oriented to person, place, and time.   Psychiatric:         Behavior: Behavior normal.         Thought Content: Thought content normal.         Judgment: Judgment normal.           Procedure   Procedures       Assessment & Plan      Diagnosis Plan   1. POTS (postural orthostatic tachycardia syndrome)        2. Palpitations        3. Dyspnea on exertion          1.  At this time, the patient appears to be doing well.  She still has some degree of symptoms related to POTS, minimal really with lifestyle modifications.  She was unable to tolerate medical therapy to address POTS in the past.  She failed metoprolol and midodrine.  We will not rechallenge medications at this time.    2.  Her workup otherwise has been benign.  She feels that she is doing well.  I do not feel further evaluation is indicated.    3.  For change in course she will call immediately.  We will continue to see her on 6 to 9-month intervals.           Arabella Ignacio  reports that she has been smoking. She has been exposed to tobacco smoke. She uses smokeless tobacco. I have educated her on the risk of diseases from using tobacco products such as cancer, COPD, and heart disease.     I advised her to quit and she is not willing to quit.    I spent 3  minutes counseling the patient.                     Electronically signed by:

## 2024-12-30 ENCOUNTER — APPOINTMENT (OUTPATIENT)
Dept: GENERAL RADIOLOGY | Facility: HOSPITAL | Age: 19
End: 2024-12-30
Payer: COMMERCIAL

## 2024-12-30 ENCOUNTER — HOSPITAL ENCOUNTER (EMERGENCY)
Facility: HOSPITAL | Age: 19
Discharge: HOME | End: 2024-12-30
Attending: EMERGENCY MEDICINE | Admitting: EMERGENCY MEDICINE
Payer: COMMERCIAL

## 2024-12-30 VITALS
RESPIRATION RATE: 18 BRPM | DIASTOLIC BLOOD PRESSURE: 92 MMHG | BODY MASS INDEX: 23.05 KG/M2 | HEART RATE: 132 BPM | HEIGHT: 64 IN | OXYGEN SATURATION: 98 % | SYSTOLIC BLOOD PRESSURE: 138 MMHG | WEIGHT: 135 LBS | TEMPERATURE: 98.9 F

## 2024-12-30 DIAGNOSIS — B33.8 RSV (RESPIRATORY SYNCYTIAL VIRUS INFECTION): Primary | ICD-10-CM

## 2024-12-30 LAB
FLUAV SUBTYP SPEC NAA+PROBE: NOT DETECTED
FLUBV RNA ISLT QL NAA+PROBE: NOT DETECTED
RSV RNA NPH QL NAA+NON-PROBE: DETECTED
SARS-COV-2 RNA RESP QL NAA+PROBE: NOT DETECTED

## 2024-12-30 PROCEDURE — 71046 X-RAY EXAM CHEST 2 VIEWS: CPT

## 2024-12-30 PROCEDURE — 87637 SARSCOV2&INF A&B&RSV AMP PRB: CPT

## 2024-12-30 PROCEDURE — 63710000001 PREDNISONE PER 5 MG: Performed by: EMERGENCY MEDICINE

## 2024-12-30 PROCEDURE — 99283 EMERGENCY DEPT VISIT LOW MDM: CPT

## 2024-12-30 RX ORDER — PREDNISONE 20 MG/1
TABLET ORAL
Qty: 12 TABLET | Refills: 0 | Status: SHIPPED | OUTPATIENT
Start: 2024-12-30

## 2024-12-30 RX ORDER — GUAIFENESIN/DEXTROMETHORPHAN 100-10MG/5
5 SYRUP ORAL ONCE
Status: COMPLETED | OUTPATIENT
Start: 2024-12-30 | End: 2024-12-30

## 2024-12-30 RX ORDER — DEXTROMETHORPHAN HYDROBROMIDE AND PROMETHAZINE HYDROCHLORIDE 15; 6.25 MG/5ML; MG/5ML
5 SYRUP ORAL 4 TIMES DAILY PRN
Qty: 120 ML | Refills: 0 | Status: SHIPPED | OUTPATIENT
Start: 2024-12-30 | End: 2025-01-05

## 2024-12-30 RX ADMIN — PREDNISONE 60 MG: 10 TABLET ORAL at 07:31

## 2024-12-30 RX ADMIN — GUAIFENESIN AND DEXTROMETHORPHAN 5 ML: 20; 200 SYRUP ORAL at 07:30

## 2024-12-30 NOTE — DISCHARGE INSTRUCTIONS
Today you are positive for RSV.  This is a viral respiratory infection.  Antibiotics will not help.    We are going to treat you with a tapering course of a steroid called prednisone as well as some appropriate cough medication.    Will be important to control your fever utilizing Tylenol or ibuprofen every 6 hours.  You need to rest and make sure to maintain adequate fluid hydration    Please read all of the instructions in this handout.  If you receive prescriptions please fill them and take them as directed.  Please call your primary care physician for follow-up appointment in the next 5 to 7 days.  If you do not have a physician you may call the [facility] referral line at [phone].    You may return to the emergency department at any time for any concerns such as worsening symptoms.  If you received a work or school note it will be printed at the back of this packet.

## 2024-12-30 NOTE — ED NOTES
Pt to Room 08 with complaint of cough and chest congestion.  Pt reports that everyone in her apartment has been diagnosed with the flu and that she feels it is deep within her chest.  Afebrile at this time.  Complaint of not being able to sleep this evening.  No further complaints at this time.

## 2024-12-30 NOTE — FSED PROVIDER NOTE
EMERGENCY DEPARTMENT ENCOUNTER    Room Number:  08/08  Date seen:  12/30/2024  Time seen: 07:17 EST  PCP: Pratima Patrick APRN  Historian:     Discussed/obtained information from independent historians:     HPI:  Patient is a 19-year-old female.  She presents with a 3 to 4-day history of cough nasal congestion subjective fever.  She reports several exposures to influenza.  1 episode of vomiting      External (non-ED) record review:        Chronic or social conditions impacting care:    ALLERGIES  Penicillins    PAST MEDICAL HISTORY  Active Ambulatory Problems     Diagnosis Date Noted    Abdominal pain 08/22/2023    Atypical chest pain 08/22/2023    Deliberate self-cutting 08/22/2023    Depression 08/22/2023    Hypoglycemia 08/22/2023    Hypokalemia 08/22/2023    Marijuana use 08/22/2023    Medical clearance for incarceration 08/22/2023    Medication overdose 08/22/2023    Menorrhagia 08/22/2023    Methamphetamine use 08/22/2023    Headache 08/22/2023    Suicidal ideations 08/22/2023    POTS (postural orthostatic tachycardia syndrome) 08/22/2023    Chronic hypotension 08/22/2023     Resolved Ambulatory Problems     Diagnosis Date Noted    No Resolved Ambulatory Problems     Past Medical History:   Diagnosis Date    ADHD (attention deficit hyperactivity disorder)     Anxiety     Bipolar 1 disorder     Low BP        PAST SURGICAL HISTORY  History reviewed. No pertinent surgical history.    FAMILY HISTORY  Family History   Family history unknown: Yes       SOCIAL HISTORY  Social History     Socioeconomic History    Marital status: Single   Tobacco Use    Smoking status: Every Day     Passive exposure: Current    Smokeless tobacco: Current    Tobacco comments:     vapes   Vaping Use    Vaping status: Every Day    Substances: Nicotine, Flavoring    Devices: Disposable    Passive vaping exposure: Yes   Substance and Sexual Activity    Alcohol use: Yes     Comment: very rare    Drug use: Not Currently     Types:  Marijuana, Amphetamines     Comment: Past Drug User.    Sexual activity: Yes     Partners: Male       REVIEW OF SYSTEMS  Review of Systems    All systems reviewed and negative except for those discussed in HPI.       PHYSICAL EXAM    I have reviewed the triage vital signs and nursing notes.  Vitals:    12/30/24 0638   BP: 138/92   Pulse: (!) 132   Resp: 18   Temp: 98.9 °F (37.2 °C)   SpO2: 98%     Physical Exam    Vital signs: Reviewed in nurses notes    General: Patient is awake alert no acute distress    HEENT: Pupils equal round responsive to light bilaterally.  Nasopharynx is congested.  Oropharynx is mildly erythematous without exudate or abscess formation    Neck:   Supple without lymphadenopathy    Respiratory:   Minimal end expiratory wheezes scattered with good breath sounds bilaterally    Cardiovascular: Rate slightly tachycardic but regular.  Rate is in the low 100s at this time    Abdomen: Nondistended    Skin:   Warm and dry.  No rashes noted    Neurological examination: Patient is awake alert oriented x4.  Speech is normal.  No facial palsy.  No focal motor or sensory deficits.    LAB RESULTS  Recent Results (from the past 24 hours)   COVID-19 and FLU A/B PCR, 1 HR TAT - Swab, Nasopharynx    Collection Time: 12/30/24  6:39 AM    Specimen: Nasopharynx; Swab   Result Value Ref Range    COVID19 Not Detected Not Detected - Ref. Range    Influenza A PCR Not Detected Not Detected    Influenza B PCR Not Detected Not Detected   RSV PCR - Swab, Nasopharynx    Collection Time: 12/30/24  6:39 AM    Specimen: Nasopharynx; Swab   Result Value Ref Range    RSV, PCR Detected (A) Not Detected       Ordered the above labs and independently interpreted results.  My findings will be discussed in the ED course or medical decision making section below      PROCEDURES:  Procedures      RADIOLOGY RESULTS  XR Chest PA & Lateral    Result Date: 12/30/2024  XR CHEST PA AND LATERAL-  HISTORY: Female who is 19 years-old, cough,  pain  TECHNIQUE: Frontal and lateral views of the chest  COMPARISON: None available  FINDINGS: Heart, mediastinum and pulmonary vasculature are unremarkable. No focal pulmonary consolidation, pleural effusion, or pneumothorax. No acute osseous process.      No evidence for acute pulmonary process. Follow-up as clinical indications persist.  This report was finalized on 12/30/2024 7:03 AM by Dr. Reynaldo Garrett M.D on Workstation: [id number]        Ordered the above noted radiological studies.  Independently interpreted by me.  My findings will be discussed in the medical decision section below.     PROGRESS, DATA ANALYSIS, CONSULTS AND MEDICAL DECISION MAKING    Please note that this section constitutes my independent interpretation of clinical data including lab results, radiology, EKG's.  This constitutes my independent professional opinion regarding differential diagnosis and management of this patient.  It may include any factors such as history from outside sources, review of external records, social determinants of health, management of medications, response to those treatments, and discussions with other providers.       Orders placed during this visit:  Orders Placed This Encounter   Procedures    COVID-19 and FLU A/B PCR, 1 HR TAT - Swab, Nasopharynx    RSV PCR - Swab, Nasopharynx    XR Chest PA & Lateral       Medications   predniSONE (DELTASONE) tablet 60 mg (has no administration in time range)   guaiFENesin-dextromethorphan (ROBITUSSIN DM) 100-10 MG/5ML syrup 5 mL (has no administration in time range)            Medical Decision Making          DIAGNOSIS  Final diagnoses:   RSV (respiratory syncytial virus infection)          Medication List        New Prescriptions      predniSONE 20 MG tablet  Commonly known as: DELTASONE  3 po daily x 2d, then 2 po daily x 2d, then 1 po daily x 2d.     promethazine-dextromethorphan 6.25-15 MG/5ML syrup  Commonly known as: PROMETHAZINE-DM  Take 5 mL by mouth 4 (Four)  Times a Day As Needed for Cough for up to 6 days.               Where to Get Your Medications        You can get these medications from any pharmacy    Bring a paper prescription for each of these medications  predniSONE 20 MG tablet  promethazine-dextromethorphan 6.25-15 MG/5ML syrup         FOLLOW-UP  Pratima Patrick, CIPRIANO  [address]  [place] [address]  [phone]    In 1 week          Latest Documented Vital Signs:  As of 07:20 EST  BP- 138/92 HR- (!) 132 Temp- 98.9 °F (37.2 °C) (Oral) O2 sat- 98%    Appropriate PPE utilized throughout this patient encounter to include mask, if indicated, per current protocol. Hand hygiene was performed before donning PPE and after removal when leaving the room.    Please note that portions of this were completed with a voice recognition program.     Note Disclaimer: At [facility], we believe that sharing information builds trust and better relationships. You are receiving this note because you are receiving care at [facility] or recently visited. It is possible you will see health information before a provider has talked with you about it. This kind of information can be easy to misunderstand. To help you fully understand what it means for your health, we urge you to discuss this note with your provider.

## 2025-05-22 ENCOUNTER — OFFICE VISIT (OUTPATIENT)
Dept: CARDIOLOGY | Facility: CLINIC | Age: 20
End: 2025-05-22
Payer: COMMERCIAL

## 2025-05-22 VITALS
WEIGHT: 142.6 LBS | HEART RATE: 91 BPM | SYSTOLIC BLOOD PRESSURE: 115 MMHG | OXYGEN SATURATION: 99 % | DIASTOLIC BLOOD PRESSURE: 79 MMHG | HEIGHT: 64 IN | BODY MASS INDEX: 24.34 KG/M2

## 2025-05-22 DIAGNOSIS — R07.2 PRECORDIAL PAIN: ICD-10-CM

## 2025-05-22 DIAGNOSIS — R06.09 DYSPNEA ON EXERTION: ICD-10-CM

## 2025-05-22 DIAGNOSIS — G90.A POTS (POSTURAL ORTHOSTATIC TACHYCARDIA SYNDROME): Primary | ICD-10-CM

## 2025-05-22 PROCEDURE — 1159F MED LIST DOCD IN RCRD: CPT | Performed by: PHYSICIAN ASSISTANT

## 2025-05-22 PROCEDURE — 1160F RVW MEDS BY RX/DR IN RCRD: CPT | Performed by: PHYSICIAN ASSISTANT

## 2025-05-22 PROCEDURE — 99214 OFFICE O/P EST MOD 30 MIN: CPT | Performed by: PHYSICIAN ASSISTANT

## 2025-05-22 NOTE — PROGRESS NOTES
Subjective   Arabella Pierre is a 20 y.o. female     Chief Complaint   Patient presents with    9 month follow up    POTS    [facility] ER follow up     Chest Pain   Problem List :   Palpitations    Rapid Heart Rate       HPI    The patient presents in clinic today for follow-up.  She has been seen historically because of the reported POTS.  She was diagnosed of the same because of previous tilt table test findings by report.  Echo at that time indicated normal systolic function with no significant valvular nor structural abnormalities.  We initially started her with midodrine and metoprolol.  She did not do well with those medications.  She had side effects which she could not recall which medication was responsible for the same.  She stopped both of those.  She has been followed closely since.  She still has some degree of symptoms of POTS.  Her big complaint however is with chest pain recently.  She had what she initially was felt to be secondary to reflux.  This became severe and she went to the emergency room locally.  Cardiac enzymes, EKG findings, and workup otherwise was unremarkable.  She indeed was advised that likely was reflux but advised to follow-up with us today.  She still has intermittent chest pain.  Symptoms of POTS remain mostly minimal with lifestyle modifications.  She has no further complaints.    Current Outpatient Medications   Medication Sig Dispense Refill    hydrOXYzine (ATARAX) 25 MG tablet Take 1 tablet by mouth 3 (Three) Times a Day As Needed for Anxiety.      glucose monitor monitoring kit 1 each Daily. 1 each 1    predniSONE (DELTASONE) 20 MG tablet 3 po daily x 2d, then 2 po daily x 2d, then 1 po daily x 2d. 12 tablet 0     No current facility-administered medications for this visit.       Penicillins    Past Medical History:   Diagnosis Date    ADHD (attention deficit hyperactivity disorder)     Anxiety     Bipolar 1 disorder     Depression     GERD (gastroesophageal reflux disease)  "04/2025    Low BP        Social History     Socioeconomic History    Marital status: Single   Tobacco Use    Smoking status: Never     Passive exposure: Current    Smokeless tobacco: Current    Tobacco comments:     vapes   Vaping Use    Vaping status: Every Day    Substances: Nicotine, Flavoring    Devices: Disposable    Passive vaping exposure: Yes   Substance and Sexual Activity    Alcohol use: Yes     Comment: very rare    Drug use: Not Currently     Types: Marijuana, Amphetamines     Comment: Past Drug User.    Sexual activity: Yes     Partners: Male       Family History   Family history unknown: Yes       Review of Systems   Constitutional:  Positive for fatigue. Negative for chills and fever.   HENT:  Positive for congestion. Negative for rhinorrhea and sore throat.    Eyes:  Negative for visual disturbance.   Respiratory:  Negative for apnea, chest tightness and shortness of breath.    Cardiovascular:  Positive for chest pain (sharp burning sensation) and palpitations (racing skips flutters). Negative for leg swelling.   Gastrointestinal:  Positive for nausea (every morning). Negative for constipation and diarrhea.   Musculoskeletal:  Positive for back pain. Negative for arthralgias and neck pain.   Skin:  Negative for rash and wound.   Allergic/Immunologic: Positive for environmental allergies. Negative for food allergies.   Neurological:  Positive for dizziness (random), weakness and light-headedness. Negative for syncope.   Hematological:  Does not bruise/bleed easily.   Psychiatric/Behavioral:  Negative for sleep disturbance.        Objective     Vitals:    05/22/25 1314   BP: 115/79   BP Location: Left arm   Patient Position: Sitting   Cuff Size: Adult   Pulse: 91   SpO2: 99%   Weight: 64.7 kg (142 lb 9.6 oz)   Height: 162.6 cm (64.02\")        /79 (BP Location: Left arm, Patient Position: Sitting, Cuff Size: Adult)   Pulse 91   Ht 162.6 cm (64.02\")   Wt 64.7 kg (142 lb 9.6 oz)   SpO2 99%   " BMI 24.47 kg/m²      Lab Results (most recent)       None            Physical Exam  Vitals and nursing note reviewed.   Constitutional:       General: She is not in acute distress.     Appearance: She is well-developed.   HENT:      Head: Normocephalic and atraumatic.   Eyes:      Conjunctiva/sclera: Conjunctivae normal.      Pupils: Pupils are equal, round, and reactive to light.   Neck:      Vascular: No JVD.      Trachea: No tracheal deviation.   Cardiovascular:      Rate and Rhythm: Normal rate and regular rhythm.      Heart sounds: Normal heart sounds.   Pulmonary:      Effort: Pulmonary effort is normal.      Breath sounds: Normal breath sounds.   Abdominal:      General: Bowel sounds are normal. There is no distension.      Palpations: Abdomen is soft. There is no mass.      Tenderness: There is no abdominal tenderness. There is no guarding or rebound.   Musculoskeletal:         General: No tenderness or deformity. Normal range of motion.      Cervical back: Normal range of motion and neck supple.   Skin:     General: Skin is warm and dry.      Coloration: Skin is not pale.      Findings: No erythema or rash.   Neurological:      Mental Status: She is alert and oriented to person, place, and time.   Psychiatric:         Behavior: Behavior normal.         Thought Content: Thought content normal.         Judgment: Judgment normal.         Procedure   Procedures         Assessment & Plan      Diagnosis Plan   1. POTS (postural orthostatic tachycardia syndrome)  Adult Transthoracic Echo Complete W/ Cont if Necessary Per Protocol    Treadmill Stress Test      2. Dyspnea on exertion  Adult Transthoracic Echo Complete W/ Cont if Necessary Per Protocol    Treadmill Stress Test      3. Precordial pain  Adult Transthoracic Echo Complete W/ Cont if Necessary Per Protocol    Treadmill Stress Test        1.  At this time, the patient appears to be doing fairly well.  She is concerned with chest pain.  It has been a while  since she has had cardiac evaluation.  We will schedule for regular treadmill stress test just for further risk stratification.    2.  Echo will be performed as well.  We can evaluate systolic function, the pericardium, and cardiac structure otherwise.    3.  We have utilized midodrine and metoprolol historically to empirically address POTS.  She had side effects with one of the medications and stopped them both.  She feels well enough for now that she would like to hold on any further adjustment for therapy at the POTS.  Should this change she will call.  She will continue appropriate hydration, sodium intake, compression hose therapy, etc.    4.  As we know results of the above, we can see the patient back in recommended further.  She will call for any issues.           Arabella Ignacio  reports that she has never smoked. She has been exposed to tobacco smoke. She uses smokeless tobacco. I have educated her on the risk of diseases from using tobacco products such as cancer, COPD, and heart disease. Patient vapes.    I advised her to quit and she is not willing to quit.    I spent 5.5 minutes counseling the patient.          BMI is within normal parameters. No other follow-up for BMI required.           Electronically signed by:

## 2025-06-24 ENCOUNTER — TELEPHONE (OUTPATIENT)
Dept: CARDIOLOGY | Facility: CLINIC | Age: 20
End: 2025-06-24
Payer: COMMERCIAL

## 2025-06-24 DIAGNOSIS — R00.2 PALPITATIONS: ICD-10-CM

## 2025-06-24 DIAGNOSIS — I95.89 CHRONIC HYPOTENSION: ICD-10-CM

## 2025-06-24 DIAGNOSIS — R06.09 DYSPNEA ON EXERTION: ICD-10-CM

## 2025-06-24 DIAGNOSIS — G90.A POTS (POSTURAL ORTHOSTATIC TACHYCARDIA SYNDROME): Primary | ICD-10-CM

## 2025-06-25 ENCOUNTER — LAB (OUTPATIENT)
Dept: LAB | Facility: HOSPITAL | Age: 20
End: 2025-06-25
Payer: COMMERCIAL

## 2025-06-25 DIAGNOSIS — R06.09 DYSPNEA ON EXERTION: ICD-10-CM

## 2025-06-25 DIAGNOSIS — I95.89 CHRONIC HYPOTENSION: ICD-10-CM

## 2025-06-25 DIAGNOSIS — G90.A POTS (POSTURAL ORTHOSTATIC TACHYCARDIA SYNDROME): ICD-10-CM

## 2025-06-25 DIAGNOSIS — R00.2 PALPITATIONS: ICD-10-CM

## 2025-06-25 LAB
ALBUMIN SERPL-MCNC: 4.3 G/DL (ref 3.5–5.2)
ALBUMIN/GLOB SERPL: 1.5 G/DL
ALP SERPL-CCNC: 66 U/L (ref 39–117)
ALT SERPL W P-5'-P-CCNC: 11 U/L (ref 1–33)
ANION GAP SERPL CALCULATED.3IONS-SCNC: 9.5 MMOL/L (ref 5–15)
AST SERPL-CCNC: 17 U/L (ref 1–32)
BASOPHILS # BLD AUTO: 0.02 10*3/MM3 (ref 0–0.2)
BASOPHILS NFR BLD AUTO: 0.5 % (ref 0–1.5)
BILIRUB SERPL-MCNC: 0.4 MG/DL (ref 0–1.2)
BUN SERPL-MCNC: 10.6 MG/DL (ref 6–20)
BUN/CREAT SERPL: 15.4 (ref 7–25)
CALCIUM SPEC-SCNC: 9 MG/DL (ref 8.6–10.5)
CHLORIDE SERPL-SCNC: 104 MMOL/L (ref 98–107)
CO2 SERPL-SCNC: 23.5 MMOL/L (ref 22–29)
CREAT SERPL-MCNC: 0.69 MG/DL (ref 0.57–1)
DEPRECATED RDW RBC AUTO: 41.8 FL (ref 37–54)
EGFRCR SERPLBLD CKD-EPI 2021: 126 ML/MIN/1.73
EOSINOPHIL # BLD AUTO: 0.18 10*3/MM3 (ref 0–0.4)
EOSINOPHIL NFR BLD AUTO: 4.1 % (ref 0.3–6.2)
ERYTHROCYTE [DISTWIDTH] IN BLOOD BY AUTOMATED COUNT: 12.7 % (ref 12.3–15.4)
GLOBULIN UR ELPH-MCNC: 2.9 GM/DL
GLUCOSE SERPL-MCNC: 92 MG/DL (ref 65–99)
HCT VFR BLD AUTO: 39.2 % (ref 34–46.6)
HGB BLD-MCNC: 12.8 G/DL (ref 12–15.9)
IMM GRANULOCYTES # BLD AUTO: 0.01 10*3/MM3 (ref 0–0.05)
IMM GRANULOCYTES NFR BLD AUTO: 0.2 % (ref 0–0.5)
LYMPHOCYTES # BLD AUTO: 1.73 10*3/MM3 (ref 0.7–3.1)
LYMPHOCYTES NFR BLD AUTO: 39.6 % (ref 19.6–45.3)
MCH RBC QN AUTO: 29.7 PG (ref 26.6–33)
MCHC RBC AUTO-ENTMCNC: 32.7 G/DL (ref 31.5–35.7)
MCV RBC AUTO: 91 FL (ref 79–97)
MONOCYTES # BLD AUTO: 0.32 10*3/MM3 (ref 0.1–0.9)
MONOCYTES NFR BLD AUTO: 7.3 % (ref 5–12)
NEUTROPHILS NFR BLD AUTO: 2.11 10*3/MM3 (ref 1.7–7)
NEUTROPHILS NFR BLD AUTO: 48.3 % (ref 42.7–76)
NRBC BLD AUTO-RTO: 0 /100 WBC (ref 0–0.2)
PLATELET # BLD AUTO: 153 10*3/MM3 (ref 140–450)
PMV BLD AUTO: 11.8 FL (ref 6–12)
POTASSIUM SERPL-SCNC: 4.1 MMOL/L (ref 3.5–5.2)
PROT SERPL-MCNC: 7.2 G/DL (ref 6–8.5)
RBC # BLD AUTO: 4.31 10*6/MM3 (ref 3.77–5.28)
SODIUM SERPL-SCNC: 137 MMOL/L (ref 136–145)
TSH SERPL DL<=0.05 MIU/L-ACNC: 1.62 UIU/ML (ref 0.27–4.2)
WBC NRBC COR # BLD AUTO: 4.37 10*3/MM3 (ref 3.4–10.8)

## 2025-06-25 PROCEDURE — 36415 COLL VENOUS BLD VENIPUNCTURE: CPT

## 2025-06-25 PROCEDURE — 84443 ASSAY THYROID STIM HORMONE: CPT

## 2025-06-25 PROCEDURE — 80053 COMPREHEN METABOLIC PANEL: CPT

## 2025-06-25 PROCEDURE — 85025 COMPLETE CBC W/AUTO DIFF WBC: CPT

## 2025-06-27 ENCOUNTER — RESULTS FOLLOW-UP (OUTPATIENT)
Dept: LAB | Facility: HOSPITAL | Age: 20
End: 2025-06-27
Payer: COMMERCIAL

## 2025-06-27 NOTE — TELEPHONE ENCOUNTER
Relay  No answer, LVM to return call    Lokesh Hylton PA to Me (Selected Message)      6/27/25  8:51 AM  Result Note  TSH within normal limits.  Chemistry profile findings normal.  Hematologic parameters normal.  Routine follow-up.  TSH; Comprehensive Metabolic Panel; CBC Auto Differential

## 2025-06-30 NOTE — TELEPHONE ENCOUNTER
Second attempt to reach pt. Left a voicemail for pt to return my call at [phone].       RELAY      TSH within normal limits. Chemistry profile findings normal. Hematologic parameters normal. Routine follow-up.

## 2025-07-17 ENCOUNTER — TELEPHONE (OUTPATIENT)
Dept: CARDIOLOGY | Facility: CLINIC | Age: 20
End: 2025-07-17
Payer: COMMERCIAL

## 2025-07-17 NOTE — TELEPHONE ENCOUNTER
Name: Arabella Pierre      Relationship: Self      Best Callback Number:   Telephone Information:   Mobile [phone]         HUB PROVIDED THE RELAY MESSAGE FROM THE OFFICE      PATIENT: HAS FURTHER QUESTIONS AND WOULD LIKE A CALL BACK AT THE FOLLOWING PHONE [phone]    ADDITIONAL INFORMATION: PATIENT IS UNDERSTANDING OF NORMAL RESULTS BUT WOULD LIKE ADDITIONAL INFORMATION ON TESTS AND WHAT THEY ARE FOR.

## 2025-07-17 NOTE — TELEPHONE ENCOUNTER
Called patient discussed lab result's which were normal. Patient verbalizes understanding of instruction's.

## 2025-08-11 ENCOUNTER — HOSPITAL ENCOUNTER (OUTPATIENT)
Dept: CARDIOLOGY | Facility: HOSPITAL | Age: 20
Discharge: HOME | End: 2025-08-11
Payer: COMMERCIAL

## 2025-08-11 DIAGNOSIS — R07.2 PRECORDIAL PAIN: ICD-10-CM

## 2025-08-11 DIAGNOSIS — R06.09 DYSPNEA ON EXERTION: ICD-10-CM

## 2025-08-11 DIAGNOSIS — G90.A POTS (POSTURAL ORTHOSTATIC TACHYCARDIA SYNDROME): ICD-10-CM

## 2025-08-11 LAB
AV MEAN PRESS GRAD SYS DOP V1V2: 3.6 MMHG
AV VMAX SYS DOP: 127.6 CM/SEC
BH CV ECHO MEAS - ACS: 1.97 CM
BH CV ECHO MEAS - AO MAX PG: 6.5 MMHG
BH CV ECHO MEAS - AO ROOT DIAM: 2.5 CM
BH CV ECHO MEAS - AO V2 VTI: 22.8 CM
BH CV ECHO MEAS - EDV(CUBED): 54.9 ML
BH CV ECHO MEAS - EDV(MOD-SP4): 52.1 ML
BH CV ECHO MEAS - EF(MOD-SP4): 56 %
BH CV ECHO MEAS - ESV(CUBED): 13.7 ML
BH CV ECHO MEAS - ESV(MOD-SP4): 22.9 ML
BH CV ECHO MEAS - FS: 37.1 %
BH CV ECHO MEAS - IVS/LVPW: 0.85 CM
BH CV ECHO MEAS - IVSD: 0.86 CM
BH CV ECHO MEAS - LA DIMENSION: 2.09 CM
BH CV ECHO MEAS - LAT PEAK E' VEL: 17.4 CM/SEC
BH CV ECHO MEAS - LV DIASTOLIC VOL/BSA (35-75): 30.8 CM2
BH CV ECHO MEAS - LV MASS(C)D: 106.3 GRAMS
BH CV ECHO MEAS - LV SYSTOLIC VOL/BSA (12-30): 13.5 CM2
BH CV ECHO MEAS - LVIDD: 3.8 CM
BH CV ECHO MEAS - LVIDS: 2.39 CM
BH CV ECHO MEAS - LVPWD: 1.01 CM
BH CV ECHO MEAS - MED PEAK E' VEL: 10 CM/SEC
BH CV ECHO MEAS - MV A MAX VEL: 51.4 CM/SEC
BH CV ECHO MEAS - MV DEC TIME: 0.2 SEC
BH CV ECHO MEAS - MV E MAX VEL: 102.8 CM/SEC
BH CV ECHO MEAS - MV E/A: 2
BH CV ECHO MEAS - RVDD: 1.68 CM
BH CV ECHO MEAS - SV(MOD-SP4): 29.2 ML
BH CV ECHO MEAS - SVI(MOD-SP4): 17.3 ML/M2
BH CV ECHO MEASUREMENTS AVERAGE E/E' RATIO: 7.5
IVRT: 77 MS
LEFT ATRIUM VOLUME INDEX: 8.5 ML/M2

## 2025-08-11 PROCEDURE — 93306 TTE W/DOPPLER COMPLETE: CPT

## 2025-08-11 PROCEDURE — 93017 CV STRESS TEST TRACING ONLY: CPT

## 2025-08-18 LAB
BH CV STRESS DURATION MIN STAGE 1: 3
BH CV STRESS DURATION SEC STAGE 1: 0
BH CV STRESS GRADE STAGE 1: 10
BH CV STRESS METS STAGE 1: 5
BH CV STRESS PROTOCOL 1: NORMAL
BH CV STRESS RECOVERY BP: NORMAL MMHG
BH CV STRESS RECOVERY HR: 93 BPM
BH CV STRESS SPEED STAGE 1: 1.7
BH CV STRESS STAGE 1: 1
MAXIMAL PREDICTED HEART RATE: 198 BPM
PERCENT MAX PREDICTED HR: 82.83 %
STRESS BASELINE BP: NORMAL MMHG
STRESS BASELINE HR: 85 BPM
STRESS PERCENT HR: 97 %
STRESS POST ESTIMATED WORKLOAD: 7 METS
STRESS POST EXERCISE DUR MIN: 5 MIN
STRESS POST EXERCISE DUR SEC: 38 SEC
STRESS POST PEAK BP: NORMAL MMHG
STRESS POST PEAK HR: 164 BPM
STRESS TARGET HR: 168 BPM

## 2025-08-24 ENCOUNTER — RESULTS FOLLOW-UP (OUTPATIENT)
Dept: CARDIOLOGY | Facility: CLINIC | Age: 20
End: 2025-08-24
Payer: COMMERCIAL

## 2025-08-25 ENCOUNTER — TELEPHONE (OUTPATIENT)
Dept: CARDIOLOGY | Facility: CLINIC | Age: 20
End: 2025-08-25
Payer: COMMERCIAL